# Patient Record
Sex: FEMALE | Race: WHITE | NOT HISPANIC OR LATINO | Employment: OTHER | URBAN - NONMETROPOLITAN AREA
[De-identification: names, ages, dates, MRNs, and addresses within clinical notes are randomized per-mention and may not be internally consistent; named-entity substitution may affect disease eponyms.]

---

## 2018-07-03 ENCOUNTER — TRANSCRIBE ORDERS (OUTPATIENT)
Dept: ADMINISTRATIVE | Facility: HOSPITAL | Age: 53
End: 2018-07-03

## 2018-07-03 DIAGNOSIS — Z12.39 SCREENING BREAST EXAMINATION: Primary | ICD-10-CM

## 2019-10-11 ENCOUNTER — TRANSCRIBE ORDERS (OUTPATIENT)
Dept: GENERAL RADIOLOGY | Facility: HOSPITAL | Age: 54
End: 2019-10-11

## 2019-10-11 ENCOUNTER — HOSPITAL ENCOUNTER (OUTPATIENT)
Dept: GENERAL RADIOLOGY | Facility: HOSPITAL | Age: 54
Discharge: HOME | End: 2019-10-11
Admitting: NURSE PRACTITIONER

## 2019-10-11 DIAGNOSIS — R07.1 PAINFUL RESPIRATION: Primary | ICD-10-CM

## 2019-10-11 DIAGNOSIS — R07.1 PAINFUL RESPIRATION: ICD-10-CM

## 2019-10-11 PROCEDURE — 71046 X-RAY EXAM CHEST 2 VIEWS: CPT

## 2020-03-13 ENCOUNTER — TRANSCRIBE ORDERS (OUTPATIENT)
Dept: ADMINISTRATIVE | Facility: HOSPITAL | Age: 55
End: 2020-03-13

## 2020-03-13 DIAGNOSIS — M54.6 PAIN IN THORACIC SPINE: Primary | ICD-10-CM

## 2020-03-13 DIAGNOSIS — M54.42 ACUTE BACK PAIN WITH SCIATICA, LEFT: ICD-10-CM

## 2020-03-24 ENCOUNTER — APPOINTMENT (OUTPATIENT)
Dept: MRI IMAGING | Facility: HOSPITAL | Age: 55
End: 2020-03-24

## 2020-03-24 ENCOUNTER — HOSPITAL ENCOUNTER (OUTPATIENT)
Dept: MRI IMAGING | Facility: HOSPITAL | Age: 55
End: 2020-03-24

## 2020-04-03 ENCOUNTER — APPOINTMENT (OUTPATIENT)
Dept: MRI IMAGING | Facility: HOSPITAL | Age: 55
End: 2020-04-03

## 2020-04-03 ENCOUNTER — HOSPITAL ENCOUNTER (OUTPATIENT)
Dept: MRI IMAGING | Facility: HOSPITAL | Age: 55
End: 2020-04-03

## 2020-04-28 ENCOUNTER — APPOINTMENT (OUTPATIENT)
Dept: MRI IMAGING | Facility: HOSPITAL | Age: 55
End: 2020-04-28

## 2020-04-28 ENCOUNTER — HOSPITAL ENCOUNTER (OUTPATIENT)
Dept: MRI IMAGING | Facility: HOSPITAL | Age: 55
End: 2020-04-28

## 2020-05-12 ENCOUNTER — TRANSCRIBE ORDERS (OUTPATIENT)
Dept: PHYSICAL THERAPY | Facility: CLINIC | Age: 55
End: 2020-05-12

## 2020-05-12 DIAGNOSIS — M25.552 LEFT HIP PAIN: ICD-10-CM

## 2020-05-12 DIAGNOSIS — M54.50 LOW BACK PAIN, UNSPECIFIED BACK PAIN LATERALITY, UNSPECIFIED CHRONICITY, UNSPECIFIED WHETHER SCIATICA PRESENT: Primary | ICD-10-CM

## 2020-05-18 ENCOUNTER — TREATMENT (OUTPATIENT)
Dept: PHYSICAL THERAPY | Facility: CLINIC | Age: 55
End: 2020-05-18

## 2020-05-18 DIAGNOSIS — M54.42 ACUTE LEFT-SIDED LOW BACK PAIN WITH LEFT-SIDED SCIATICA: Primary | ICD-10-CM

## 2020-05-18 PROCEDURE — 97110 THERAPEUTIC EXERCISES: CPT | Performed by: PHYSICAL THERAPIST

## 2020-05-18 PROCEDURE — 97161 PT EVAL LOW COMPLEX 20 MIN: CPT | Performed by: PHYSICAL THERAPIST

## 2020-05-18 NOTE — PROGRESS NOTES
Physical Therapy Initial Evaluation and Plan of Care      Patient: Brad Snowden   : 1965  Diagnosis/ICD-10 Code:  Acute left-sided low back pain with left-sided sciatica [M54.42]  Referring practitioner: Chantal Espinoza PA-C    Subjective Evaluation    History of Present Illness  Mechanism of injury: Fall at work on 3/6/20.  She was at work and fell over some bun trays.  She landed on the L side and hip.      Pt went to the MD on 3/10/20 at the ED.  She went to regular MD after that.            Patient Occupation: works at [employer].    She is currently off work since 3/14/20.    Pain  Current pain ratin  Location: Mid part of the lumbar spine and the L L/S.  no leg pain at this time.  Shooting pain occasionally in the L LE.  Quality: pressure  Relieving factors: support and rest (sitting on the R side)  Aggravating factors: movement, lifting, ambulation and repetitive movement  Progression: no change             Objective          Postural Observations    Additional Postural Observation Details  B Paraspinal hypertonic and guarded.      Active Range of Motion     Lumbar   Flexion: Active lumbar flexion: Finger tips to knee cap B LE.  with pain  Extension: Active lumbar extension: 20% with pain    Additional Active Range of Motion Details  Pseudorotation same amount of pain as Rotation.          Passive Range of Motion     Additional Passive Range of Motion Details  Lumbar LTR mobility L 15 degrees,  R 20 degrees.     PROM of the spine is guarded and painful.  End feel is normal except for the guarding.      Strength/Myotome Testing     Lumbar   Left   Heel walk: normal  Toe walk: normal    Right   Heel walk: normal  Toe walk: normal    Tests     Lumbar     Left   Negative crossed SLR and passive SLR.     Right   Negative crossed SLR and passive SLR.     Additional Tests Details  Supine hook lying 4/10 pain initially.      Ambulation     Observational Gait   Gait: antalgic   Decreased walking speed  and stride length.   Base of support: increased    Quality of Movement During Gait   Trunk  Forward lean.           Assessment & Plan     Assessment  Impairments: abnormal muscle tone, abnormal or restricted ROM, activity intolerance, lacks appropriate home exercise program and pain with function  Assessment details: Patient is a 54 year old female who comes to physical therapy with injury from work.  Signs and symptoms are consistent with lumbar injury.  She has no radicular sxs today.  All of her sxs are central in the lumbar spine.  The patient currently has pain, decreased ROM, decreased strength, and inability to perform all essential functional activities. Pt will benefit from skilled PT services to address the above issues.     Prognosis: good  Functional Limitations: carrying objects, lifting, pulling, pushing, uncomfortable because of pain, sitting, standing, stooping and unable to perform repetitive tasks  Goals  Plan Goals: SHORT TERM GOALS:     2 weeks  1. Pt independent with HEP  2. Pt to demonstrate trunk AROM 50-75% of expected norms to allow for improved ability to perform ADL's  3. Pt to demonstrate bilateral hip strength 4/5 in all planes to improved stability of the core/trunk     LONG TERM GOALS:   6 weeks  1. Pt to demonstrate trunk AROM % of expected norms to allow for improved ability to perform functional activities  2. Pt to demonstrate ability to perform full functional squat with good form and without increased pain in the low back   3. Pt to report being able to work full shift or work in the home without increase in pain in the back  4. Pt to report cessation of pain/numbness/tingling into the left leg to show decreased nerve compression      Plan  Therapy options: will be seen for skilled physical therapy services  Planned modality interventions: cryotherapy, thermotherapy (hydrocollator packs) and electrical stimulation/Russian stimulation  Planned therapy interventions:  abdominal trunk stabilization, manual therapy, spinal/joint mobilization, soft tissue mobilization, strengthening, stretching, therapeutic activities, functional ROM exercises, flexibility, body mechanics training, neuromuscular re-education and postural training  Treatment plan discussed with: patient  Plan details: Pt will be seen 2-3 x / week.  Assess Pt response to PREP, posture and body mechanics.         Manual Therapy:         mins  29025;  Therapeutic Exercise:    24     mins  54150;     Neuromuscular Nuha:        mins  36793;    Therapeutic Activity:          mins  60007;     Gait Training:           mins  30839;     Ultrasound:          mins  91231;    Electrical Stimulation:         mins  65752 ( );  Dry Needling          mins self-pay    Timed Treatment:   24   mins   Total Treatment:     49   mins    PT SIGNATURE: Coleman Liang, PT   DATE TREATMENT INITIATED: 5/18/2020    Initial Certification  Certification Period: 8/16/2020  I certify that the therapy services are furnished while this patient is under my care.  The services outlined above are required by this patient, and will be reviewed every 90 days.     PHYSICIAN: Chantal Espinoza PA-C      DATE:     Please sign and return via fax to  .. Thank you, [facility].

## 2020-05-20 ENCOUNTER — TREATMENT (OUTPATIENT)
Dept: PHYSICAL THERAPY | Facility: CLINIC | Age: 55
End: 2020-05-20

## 2020-05-20 DIAGNOSIS — M54.42 ACUTE LEFT-SIDED LOW BACK PAIN WITH LEFT-SIDED SCIATICA: Primary | ICD-10-CM

## 2020-05-20 PROCEDURE — 97110 THERAPEUTIC EXERCISES: CPT | Performed by: PHYSICAL THERAPIST

## 2020-05-20 PROCEDURE — 97140 MANUAL THERAPY 1/> REGIONS: CPT | Performed by: PHYSICAL THERAPIST

## 2020-05-20 NOTE — PROGRESS NOTES
Physical Therapy Daily Progress Note    Patient Information  Brad Snowden  1965      Visit # : 2    Brad Snowden reports 4-5/10 pain today at rest.  Pt reports pain is in the Back in the same area and same size.  Pt reports last night she had some shooting pain into the L LE in different spots.  It goes half way down the shin.      Pt reports 2 times in 2 days.      Objective Pt presents to PT today with minimal+  distress noted with activity.     Pt with exercises performed with minimal + feedback.  No significant changes in the sxs with activity.     Palpation:  Minimal elevated hypertonicity noted in the L lumbar spine.  She is reporting pain diffusely throughout the lumbar and hip pelvic area B L greater than R.     Lumbar distraction / myofascial unloaded did not reduce pain.      Palpation exam and gentle testing seemed to elevate the pain.      MARQUISE stretch elevated pain to 6/10 in the lumbar spine.     See Exercise, Manual, and Modality Logs for complete treatment.     Assessment/Plan  Pt with pain about the same.  She is needed cues for HEP performance.        Progress per Plan of Care and Progress strengthening /stabilization /functional activity      Visit Diagnoses:    ICD-10-CM ICD-9-CM   1. Acute left-sided low back pain with left-sided sciatica M54.42 724.2     724.3            Manual Therapy:    9     mins  08817;  Therapeutic Exercise:    43     mins  63541;     Neuromuscular Nuha:        mins  14695;    Therapeutic Activity:          mins  25533;     Gait Training:        ___  mins  78817;     Ultrasound:          mins  34110;    Electrical Stimulation:         mins  52901 ( );  Dry Needling          mins self-pay    Timed Treatment:   52   mins   Total Treatment:     52   mins    Coleman Liang, PT  Physical Therapist

## 2020-05-27 ENCOUNTER — TREATMENT (OUTPATIENT)
Dept: PHYSICAL THERAPY | Facility: CLINIC | Age: 55
End: 2020-05-27

## 2020-05-27 DIAGNOSIS — M54.42 ACUTE LEFT-SIDED LOW BACK PAIN WITH LEFT-SIDED SCIATICA: Primary | ICD-10-CM

## 2020-05-27 PROCEDURE — 97140 MANUAL THERAPY 1/> REGIONS: CPT | Performed by: PHYSICAL THERAPIST

## 2020-05-27 PROCEDURE — 97110 THERAPEUTIC EXERCISES: CPT | Performed by: PHYSICAL THERAPIST

## 2020-05-27 PROCEDURE — 97530 THERAPEUTIC ACTIVITIES: CPT | Performed by: PHYSICAL THERAPIST

## 2020-05-27 NOTE — PROGRESS NOTES
Physical Therapy Daily Progress Note    Patient Information  Brad Snowden  1965      Visit # : 3    Brad Snowden reports 8/10 pain today at rest.  Pt reports that she has been really bad for the past 3 days for no known reason.  Her L hip popped and it had a lot of pain associated with it.    Pain is primarily in the sacrum and the lumbar spine secondarily.  Standing up tall creates pain in the back and down the inside of the legs in the groin.      Pt reports the exercises seem to be making her sxs worse.   Pt is returning back to the MD tomorrow.      Objective Pt presents to PT today with forward flexed trunk and moderate distress noted with resting position and moderate to severe distress with ambulation.     AROM:  Trunk flexion Finger tips to top of the knee.  Trunk ext to neutral only with pain.   Trunk extension is more painful today.     Left hip mobility creates more pain than the R side today.     Trial of SI belt today seemed to not have a significant improvement in sxs.  Pt was very guarded with transfers and PROM activity.     SLR is (-) B LE    Upper Lumbar Neural tension test (+) for pain and guarding in B LE's with slight reproduction of sxs in to the thigh. L greater than the R LE.     See Exercise, Manual, and Modality Logs for complete treatment.     Assessment/Plan  Pt with elevated sxs today and over the past 3 days.  Pt with no improvement in the 3 visits of PT and her sxs today are worse.  She did seem to have some pain with Upper lumbar neural tension L greater than the R LE.     Positional testing did not reduce or elevate any symptoms today.         Awaiting MD orders  We will hold until further MD follow up and instruction.                  Visit Diagnoses:    ICD-10-CM ICD-9-CM   1. Acute left-sided low back pain with left-sided sciatica M54.42 724.2     724.3            Manual Therapy:    13     mins  68406;  Therapeutic Exercise:    28     mins  81943;     Neuromuscular Nuha:         mins  87975;    Therapeutic Activity:     11     mins  13874;     Gait Training:        ___  mins  94252;     Ultrasound:          mins  96862;    Electrical Stimulation:         mins  89042 ( );  Dry Needling          mins self-pay    Timed Treatment:   52   mins   Total Treatment:     52   mins    Coleman Liang, PT  Physical Therapist

## 2020-06-25 ENCOUNTER — TRANSCRIBE ORDERS (OUTPATIENT)
Dept: MRI IMAGING | Facility: HOSPITAL | Age: 55
End: 2020-06-25

## 2020-06-25 DIAGNOSIS — S13.4XXA WHIPLASH INJURY TO NECK, INITIAL ENCOUNTER: Primary | ICD-10-CM

## 2020-07-10 ENCOUNTER — HOSPITAL ENCOUNTER (OUTPATIENT)
Dept: MRI IMAGING | Facility: HOSPITAL | Age: 55
Discharge: HOME | End: 2020-07-10
Admitting: ORTHOPAEDIC SURGERY

## 2020-07-10 DIAGNOSIS — S13.4XXA WHIPLASH INJURY TO NECK, INITIAL ENCOUNTER: ICD-10-CM

## 2020-07-10 PROCEDURE — 72141 MRI NECK SPINE W/O DYE: CPT

## 2020-08-27 ENCOUNTER — TRANSCRIBE ORDERS (OUTPATIENT)
Dept: ADMINISTRATIVE | Facility: HOSPITAL | Age: 55
End: 2020-08-27

## 2020-08-27 DIAGNOSIS — R10.30 LOWER ABDOMINAL PAIN: Primary | ICD-10-CM

## 2020-09-18 ENCOUNTER — TRANSCRIBE ORDERS (OUTPATIENT)
Dept: PHYSICAL THERAPY | Facility: CLINIC | Age: 55
End: 2020-09-18

## 2020-09-18 DIAGNOSIS — M54.12 RADICULOPATHY, CERVICAL: Primary | ICD-10-CM

## 2020-09-24 ENCOUNTER — HOSPITAL ENCOUNTER (OUTPATIENT)
Dept: CT IMAGING | Facility: HOSPITAL | Age: 55
Discharge: HOME | End: 2020-09-24
Admitting: NURSE PRACTITIONER

## 2020-09-24 DIAGNOSIS — R10.30 LOWER ABDOMINAL PAIN: ICD-10-CM

## 2020-09-24 PROCEDURE — 74176 CT ABD & PELVIS W/O CONTRAST: CPT

## 2020-10-08 ENCOUNTER — TELEPHONE (OUTPATIENT)
Dept: ORTHOPEDICS | Facility: OTHER | Age: 55
End: 2020-10-08

## 2020-12-16 ENCOUNTER — TREATMENT (OUTPATIENT)
Dept: PHYSICAL THERAPY | Facility: CLINIC | Age: 55
End: 2020-12-16

## 2020-12-16 ENCOUNTER — TRANSCRIBE ORDERS (OUTPATIENT)
Dept: PHYSICAL THERAPY | Facility: CLINIC | Age: 55
End: 2020-12-16

## 2020-12-16 DIAGNOSIS — M54.12 RADICULOPATHY, CERVICAL: Primary | ICD-10-CM

## 2020-12-16 DIAGNOSIS — M54.12 CERVICAL RADICULAR PAIN: Primary | ICD-10-CM

## 2020-12-16 PROCEDURE — 97161 PT EVAL LOW COMPLEX 20 MIN: CPT | Performed by: PHYSICAL THERAPIST

## 2020-12-16 PROCEDURE — 97140 MANUAL THERAPY 1/> REGIONS: CPT | Performed by: PHYSICAL THERAPIST

## 2020-12-16 PROCEDURE — 97110 THERAPEUTIC EXERCISES: CPT | Performed by: PHYSICAL THERAPIST

## 2020-12-16 NOTE — PROGRESS NOTES
Physical Therapy Initial Evaluation and Plan of Care      Patient: Brad Snowden   : 1965  Diagnosis/ICD-10 Code:  Cervical radicular pain [M54.12]  Referring practitioner: Talia Contreras DO    Subjective Evaluation    History of Present Illness  Mechanism of injury: Patient reports she fell on 2020 which resulted in neck, back and shoulder pain. She received treatment for her back pain and she thought her neck pain would resolve on its own. Her neck pain has continued to worsen and has really bothersome since . She reports that she has numbness and pain that goes down into her L UE but this does not happen all the time. She reports that at times her neck will catch with certain movements. She reports that sitting upright without any support is really irritating. She stated that standing on concrete at work really irritates her neck after approx 2 hours.    She also reports that she slept weird two nights ago and her pain has been really irritated since then. Prior to sleeping in a weird position she feels her neck was some better.    She reports that she is currently under restrictions from MD that consist of no repetitive twisting, reaching overhead, or mopping and sweeping, and can not lift >10lbs.    Quality of life: good    Pain  Current pain ratin  At best pain ratin  At worst pain ratin  Quality: dull ache (Dull ache that is always present)  Relieving factors: medications and ice (Patient reports since she has been prescribed a muscle relaxer her pain has improved but she is still in pain )  Aggravating factors: overhead activity, lifting, outstretched reach, repetitive movement, prolonged positioning and standing    Hand dominance: left    Treatments  Previous treatment: medication  Current treatment: medication and physical therapy  Patient Goals  Patient goals for therapy: decreased pain, independence with ADLs/IADLs and return to work             Objective          Postural  Observations  Seated posture: fair  Standing posture: good    Additional Postural Observation Details  Patient has rounded shoulders with a forward head in seated position.    Palpation   Left   Hypertonic in the middle trapezius and upper trapezius.   Tenderness of the middle trapezius and upper trapezius.     Neurological Testing     Sensation   Cervical/Thoracic   Left   Intact: light touch    Right   Intact: light touch    Reflexes   Left   Biceps (C5/C6): normal (2+)  Brachioradialis (C6): normal (2+)  Triceps (C7): trace (1+)    Right   Biceps (C5/C6): normal (2+)  Brachioradialis (C6): normal (2+)  Triceps (C7): normal (2+)    Additional Neurological Details  Sensation intact and equal on B UE.    Active Range of Motion   Cervical/Thoracic Spine   Cervical    Flexion: 22 degrees   Extension: 16 degrees   Left lateral flexion: 19 degrees   Right lateral flexion: 17 (R lateral flexion is more painful than L lateral rotation) degrees   Left rotation: 36 degrees   Right rotation: 38 (R rotation is more painful than L rotation) degrees     Thoracic   Flexion: WFL  Extension: WFL    Strength/Myotome Testing     Left Shoulder     Planes of Motion   Flexion: 4-   Abduction: 4-     Right Shoulder     Planes of Motion   Flexion: 4   Abduction: 4     Left Elbow   Flexion: 4  Extension: 4    Right Elbow   Flexion: 4+  Extension: 4+    Tests   Cervical     Left   Positive cervical distraction and Spurling's sign.     Right   Positive cervical distraction.   Negative Spurling's sign.     Thoracic   Negative slump.     Additional Tests Details  Spurling's was positive on the L with distraction relieving symptoms in L UE. Patient had complaints of muscle pain in L upper trap with testing spurling's on R but no symptoms were reproduced.          Assessment & Plan     Assessment  Impairments: abnormal muscle tone, abnormal or restricted ROM, activity intolerance, impaired physical strength, lacks appropriate home exercise  program and pain with function  Assessment details: Patient is a 55 year old female who comes to physical therapy with complaints of neck pain resulting from a fall at work that occurred earlier this year. Signs and symptoms are consistent with cervical pain with radiculopathy.  The patient currently has pain, decreased ROM, decreased strength, and inability to perform all essential functional activities. Pt will benefit from skilled PT services to address the above issues.     Functional Limitations: carrying objects, lifting, walking, pulling, pushing, uncomfortable because of pain, sitting, standing, reaching overhead and unable to perform repetitive tasks  Goals  Plan Goals: SHORT TERM GOALS:     2 weeks  1. Pt independent with HEP  2. Pt to demonstrate cervical AROM 10-25% of expected norms to allow for improved ability to perform ADL's    LONG TERM GOALS:   6 weeks  1. Pt to demonstrate cervical AROM 25-50% of expected norms to allow for improved safety when driving  2. Pt to demonstrate ability to lift 5# OH with bilateral arm(s) without increase in pain in the neck   3. Pt to report being able to work full shift or work in the home without increase in pain in the neck            Plan  Therapy options: will be seen for skilled physical therapy services  Planned modality interventions: cryotherapy  Planned therapy interventions: functional ROM exercises, home exercise program, joint mobilization, therapeutic activities, stretching, strengthening, spinal/joint mobilization, soft tissue mobilization, neuromuscular re-education and manual therapy  Frequency: 2x week  Duration in weeks: 6        Manual Therapy:    11     mins  93653;  Therapeutic Exercise:    13     mins  95662;     Neuromuscular Nuha:        mins  61744;    Therapeutic Activity:          mins  66658;     Gait Training:           mins  14819;     Ultrasound:          mins  31738;    Electrical Stimulation:         mins  08827 ( );  Dry  Needling          mins self-pay    Timed Treatment:   56   mins   Total Treatment:     67   mins    PT SIGNATURE: Emani Hannah, PT   DATE TREATMENT INITIATED: 12/16/2020    Initial Certification  Certification Period: 3/16/2021  I certify that the therapy services are furnished while this patient is under my care.  The services outlined above are required by this patient, and will be reviewed every 90 days.     PHYSICIAN: Talia Contreras, DO      DATE:     Please sign and return via fax to [phone].. Thank you, [facility].

## 2020-12-21 ENCOUNTER — TREATMENT (OUTPATIENT)
Dept: PHYSICAL THERAPY | Facility: CLINIC | Age: 55
End: 2020-12-21

## 2020-12-21 DIAGNOSIS — M54.42 ACUTE LEFT-SIDED LOW BACK PAIN WITH LEFT-SIDED SCIATICA: ICD-10-CM

## 2020-12-21 DIAGNOSIS — M54.12 CERVICAL RADICULAR PAIN: Primary | ICD-10-CM

## 2020-12-21 PROCEDURE — 97110 THERAPEUTIC EXERCISES: CPT | Performed by: PHYSICAL THERAPIST

## 2020-12-21 PROCEDURE — 97140 MANUAL THERAPY 1/> REGIONS: CPT | Performed by: PHYSICAL THERAPIST

## 2020-12-21 NOTE — PROGRESS NOTES
Physical Therapy Daily Progress Note      Visit #: 2    Brad Snowden reports 2-3/10 pain today at rest.  Pt reports that she has minimal tingling in the L hand. Pt reports that her neck is still very sore and has decreased motion.         Objective Pt present to PT today with no distress at rest.     Pt with decreased rotation, lateral flexion in cervical spine passively and actively.     Pt with no change in hand symptoms reported today.       See Exercise, Manual, and Modality Logs for complete treatment.     Assessment/Plan  Pt continues to have pain and decreased motion in the cervical spine. Pt is very guarded and avoids movement. Pt would benefit from PT to help increase cervical spine mobility and function.       Progress per Plan of Care  Progress as tolerated    Visit Diagnosis:    ICD-10-CM ICD-9-CM   1. Cervical radicular pain  M54.12 723.4   2. Acute left-sided low back pain with left-sided sciatica  M54.42 724.2     724.3            Manual Therapy:    25     mins  18315;  Therapeutic Exercise:    12     mins  38421;     Neuromuscular Nuha:        mins  09544;    Therapeutic Activity:          mins  24780;     Gait Training:           mins  22927;     Ultrasound:          mins  42664;    Electrical Stimulation:         mins  96754 ( );  Dry Needling          mins self-pay  Iontophoresis          mins 50804      Timed Treatment:   37   mins   Total Treatment:     50   mins    Suresh Rausch, PT  Physical Therapist

## 2020-12-23 ENCOUNTER — TREATMENT (OUTPATIENT)
Dept: PHYSICAL THERAPY | Facility: CLINIC | Age: 55
End: 2020-12-23

## 2020-12-23 DIAGNOSIS — M54.42 ACUTE LEFT-SIDED LOW BACK PAIN WITH LEFT-SIDED SCIATICA: Primary | ICD-10-CM

## 2020-12-23 DIAGNOSIS — M54.12 CERVICAL RADICULAR PAIN: ICD-10-CM

## 2020-12-23 PROCEDURE — 97140 MANUAL THERAPY 1/> REGIONS: CPT | Performed by: PHYSICAL THERAPIST

## 2020-12-23 PROCEDURE — 97035 APP MDLTY 1+ULTRASOUND EA 15: CPT | Performed by: PHYSICAL THERAPIST

## 2020-12-23 PROCEDURE — 97110 THERAPEUTIC EXERCISES: CPT | Performed by: PHYSICAL THERAPIST

## 2020-12-23 NOTE — PROGRESS NOTES
Physical Therapy Daily Progress Note      Visit #: 3    Brad Snowden reports 3/10 pain today at rest.  Pt reports that she had an aching pain after last session in her neck although no more pain or tingling in the L UE.         Objective Pt present to PT today with no distress at rest.     Pt with notably improved cervical motion although pt still guarded with manual therapy.     Pt with acute tenderness over C4-6 spinous processes.     Pt with tenderness over L levator scap distal insertion extending superior into the neck.       See Exercise, Manual, and Modality Logs for complete treatment.     Assessment/Plan  Pt responded well to gentle mobilization and US well today. Pt would benefit from PT to help improve UE symptoms and restore cervical function while reducing pain.       Progress per Plan of Care  Progress as tolerated    Visit Diagnosis:    ICD-10-CM ICD-9-CM   1. Acute left-sided low back pain with left-sided sciatica  M54.42 724.2     724.3   2. Cervical radicular pain  M54.12 723.4            Manual Therapy:    25     mins  56719;  Therapeutic Exercise:    10     mins  33490;     Neuromuscular Nuha:        mins  90628;    Therapeutic Activity:          mins  95720;     Gait Training:           mins  24470;     Ultrasound:    10      mins  53541;    Electrical Stimulation:         mins  02800 ( );  Dry Needling          mins self-pay  Iontophoresis          mins 42750      Timed Treatment:   45   mins   Total Treatment:     45   mins    Suresh Rausch, PT  Physical Therapist

## 2021-11-10 ENCOUNTER — TRANSCRIBE ORDERS (OUTPATIENT)
Dept: ADMINISTRATIVE | Facility: HOSPITAL | Age: 56
End: 2021-11-10

## 2021-11-10 DIAGNOSIS — R10.32 ABDOMINAL PAIN, LLQ: Primary | ICD-10-CM

## 2021-11-23 ENCOUNTER — APPOINTMENT (OUTPATIENT)
Dept: CT IMAGING | Facility: HOSPITAL | Age: 56
End: 2021-11-23

## 2022-03-18 RX ORDER — ALBUTEROL SULFATE 90 UG/1
AEROSOL, METERED RESPIRATORY (INHALATION)
COMMUNITY

## 2022-03-18 RX ORDER — AZITHROMYCIN 250 MG/1
TABLET, FILM COATED ORAL
COMMUNITY
End: 2022-10-18

## 2022-03-18 RX ORDER — SPINOSAD 9 MG/ML
SUSPENSION TOPICAL
COMMUNITY
Start: 2021-12-15 | End: 2022-10-18

## 2022-03-18 RX ORDER — TIZANIDINE 4 MG/1
TABLET ORAL
COMMUNITY
End: 2022-10-18 | Stop reason: ALTCHOICE

## 2022-03-18 RX ORDER — CYCLOBENZAPRINE HCL 10 MG
TABLET ORAL
COMMUNITY
Start: 2021-12-20 | End: 2022-12-14 | Stop reason: HOSPADM

## 2022-03-18 RX ORDER — AMMONIUM LACTATE 12 G/100G
LOTION TOPICAL
COMMUNITY
End: 2022-10-18

## 2022-03-18 RX ORDER — BUSPIRONE HYDROCHLORIDE 7.5 MG/1
7.5 TABLET ORAL 2 TIMES DAILY
COMMUNITY
Start: 2022-01-21 | End: 2022-12-14 | Stop reason: HOSPADM

## 2022-03-18 RX ORDER — PREDNISONE 10 MG/1
TABLET ORAL
COMMUNITY
End: 2022-10-18

## 2022-03-18 RX ORDER — IBUPROFEN 800 MG/1
TABLET ORAL
COMMUNITY
End: 2022-12-14 | Stop reason: HOSPADM

## 2022-03-18 RX ORDER — BETAMETHASONE DIPROPIONATE 0.5 MG/G
CREAM TOPICAL
COMMUNITY
Start: 2022-01-21

## 2022-03-18 RX ORDER — GABAPENTIN 300 MG/1
300 CAPSULE ORAL 2 TIMES DAILY
COMMUNITY
Start: 2022-01-21 | End: 2022-10-18

## 2022-03-18 RX ORDER — NICOTINE 21 MG/24HR
PATCH, TRANSDERMAL 24 HOURS TRANSDERMAL
COMMUNITY

## 2022-03-18 RX ORDER — NAPROXEN 500 MG/1
TABLET ORAL
COMMUNITY
End: 2022-10-18

## 2022-03-18 RX ORDER — BUPRENORPHINE HYDROCHLORIDE AND NALOXONE HYDROCHLORIDE DIHYDRATE 8; 2 MG/1; MG/1
TABLET SUBLINGUAL
COMMUNITY

## 2022-03-18 RX ORDER — DICLOFENAC SODIUM 75 MG/1
TABLET, DELAYED RELEASE ORAL
COMMUNITY

## 2022-03-21 RX ORDER — DOCUSATE SODIUM 100 MG/1
100 CAPSULE, LIQUID FILLED ORAL DAILY PRN
COMMUNITY
Start: 2022-03-01 | End: 2022-10-18 | Stop reason: SDUPTHER

## 2022-03-21 RX ORDER — LEVOTHYROXINE SODIUM 0.03 MG/1
TABLET ORAL
COMMUNITY
Start: 2022-03-16 | End: 2022-10-18

## 2022-03-21 RX ORDER — BUDESONIDE AND FORMOTEROL FUMARATE DIHYDRATE 160; 4.5 UG/1; UG/1
AEROSOL RESPIRATORY (INHALATION) DAILY PRN
COMMUNITY
Start: 2022-03-17

## 2022-03-21 RX ORDER — ATORVASTATIN CALCIUM 10 MG/1
10 TABLET, FILM COATED ORAL DAILY
COMMUNITY
Start: 2022-03-01 | End: 2022-10-18

## 2022-03-21 RX ORDER — MONTELUKAST SODIUM 10 MG/1
10 TABLET ORAL DAILY
COMMUNITY
Start: 2022-03-01

## 2022-03-21 RX ORDER — NICOTINE 21 MG/24HR
PATCH, TRANSDERMAL 24 HOURS TRANSDERMAL
COMMUNITY
Start: 2022-01-28 | End: 2022-03-21 | Stop reason: SDUPTHER

## 2022-03-28 ENCOUNTER — TRANSCRIBE ORDERS (OUTPATIENT)
Dept: ADMINISTRATIVE | Facility: HOSPITAL | Age: 57
End: 2022-03-28

## 2022-03-28 DIAGNOSIS — R59.0 AXILLARY LYMPHADENOPATHY: ICD-10-CM

## 2022-03-28 DIAGNOSIS — N63.20 LEFT BREAST LUMP: Primary | ICD-10-CM

## 2022-04-12 ENCOUNTER — HOSPITAL ENCOUNTER (OUTPATIENT)
Dept: ULTRASOUND IMAGING | Facility: HOSPITAL | Age: 57
End: 2022-04-12

## 2022-10-18 ENCOUNTER — OFFICE VISIT (OUTPATIENT)
Dept: GASTROENTEROLOGY | Facility: CLINIC | Age: 57
End: 2022-10-18

## 2022-10-18 ENCOUNTER — LAB (OUTPATIENT)
Dept: LAB | Facility: HOSPITAL | Age: 57
End: 2022-10-18

## 2022-10-18 VITALS
DIASTOLIC BLOOD PRESSURE: 78 MMHG | SYSTOLIC BLOOD PRESSURE: 120 MMHG | BODY MASS INDEX: 22.02 KG/M2 | HEIGHT: 66 IN | WEIGHT: 137 LBS

## 2022-10-18 DIAGNOSIS — F10.10 ALCOHOL ABUSE: Chronic | ICD-10-CM

## 2022-10-18 DIAGNOSIS — R11.0 NAUSEA: Chronic | ICD-10-CM

## 2022-10-18 DIAGNOSIS — R79.89 ELEVATED LIVER FUNCTION TESTS: Chronic | ICD-10-CM

## 2022-10-18 DIAGNOSIS — Z86.19 HISTORY OF HEPATITIS C: Chronic | ICD-10-CM

## 2022-10-18 DIAGNOSIS — K59.00 CONSTIPATION, UNSPECIFIED CONSTIPATION TYPE: Chronic | ICD-10-CM

## 2022-10-18 DIAGNOSIS — K62.5 RECTAL BLEEDING: Chronic | ICD-10-CM

## 2022-10-18 DIAGNOSIS — R10.33 PERIUMBILICAL ABDOMINAL PAIN: Primary | Chronic | ICD-10-CM

## 2022-10-18 DIAGNOSIS — K21.9 GASTROESOPHAGEAL REFLUX DISEASE, UNSPECIFIED WHETHER ESOPHAGITIS PRESENT: Chronic | ICD-10-CM

## 2022-10-18 DIAGNOSIS — Z12.11 ENCOUNTER FOR SCREENING FOR MALIGNANT NEOPLASM OF COLON: ICD-10-CM

## 2022-10-18 DIAGNOSIS — R10.33 PERIUMBILICAL ABDOMINAL PAIN: Chronic | ICD-10-CM

## 2022-10-18 PROBLEM — Z87.898 H/O INTRAVENOUS DRUG USE IN REMISSION: Status: ACTIVE | Noted: 2022-10-18

## 2022-10-18 PROBLEM — F17.200 TOBACCO USE DISORDER: Status: ACTIVE | Noted: 2022-10-18

## 2022-10-18 PROBLEM — F19.91 H/O INTRAVENOUS DRUG USE IN REMISSION: Status: ACTIVE | Noted: 2022-10-18

## 2022-10-18 PROBLEM — M54.2 CERVICAL SPINE PAIN: Status: ACTIVE | Noted: 2022-10-18

## 2022-10-18 PROBLEM — F41.9 ANXIETY: Status: ACTIVE | Noted: 2022-10-18

## 2022-10-18 PROBLEM — J44.1 COPD EXACERBATION (HCC): Status: ACTIVE | Noted: 2022-10-18

## 2022-10-18 LAB
ALBUMIN SERPL-MCNC: 4.5 G/DL (ref 3.5–5.2)
ALBUMIN/GLOB SERPL: 1.8 G/DL
ALP SERPL-CCNC: 58 U/L (ref 39–117)
ALT SERPL W P-5'-P-CCNC: 39 U/L (ref 1–33)
ANION GAP SERPL CALCULATED.3IONS-SCNC: 9.7 MMOL/L (ref 5–15)
AST SERPL-CCNC: 45 U/L (ref 1–32)
BILIRUB SERPL-MCNC: 0.3 MG/DL (ref 0–1.2)
BUN SERPL-MCNC: 4 MG/DL (ref 6–20)
BUN/CREAT SERPL: 5.9 (ref 7–25)
CALCIUM SPEC-SCNC: 9.6 MG/DL (ref 8.6–10.5)
CHLORIDE SERPL-SCNC: 104 MMOL/L (ref 98–107)
CO2 SERPL-SCNC: 25.3 MMOL/L (ref 22–29)
CREAT SERPL-MCNC: 0.68 MG/DL (ref 0.57–1)
DEPRECATED RDW RBC AUTO: 42.5 FL (ref 37–54)
EGFRCR SERPLBLD CKD-EPI 2021: 103 ML/MIN/1.73
ERYTHROCYTE [DISTWIDTH] IN BLOOD BY AUTOMATED COUNT: 12.2 % (ref 12.3–15.4)
GLOBULIN UR ELPH-MCNC: 2.5 GM/DL
GLUCOSE SERPL-MCNC: 75 MG/DL (ref 65–99)
HBV SURFACE AG SERPL QL IA: NORMAL
HCT VFR BLD AUTO: 43.4 % (ref 34–46.6)
HCV AB SER DONR QL: REACTIVE
HGB BLD-MCNC: 15 G/DL (ref 12–15.9)
HIV1 P24 AG SER QL: NORMAL
HIV1+2 AB SER QL: NORMAL
IGA1 MFR SER: 199 MG/DL (ref 70–400)
INR PPP: 0.92 (ref 0.9–1.1)
LIPASE SERPL-CCNC: 33 U/L (ref 13–60)
MCH RBC QN AUTO: 33.1 PG (ref 26.6–33)
MCHC RBC AUTO-ENTMCNC: 34.6 G/DL (ref 31.5–35.7)
MCV RBC AUTO: 95.8 FL (ref 79–97)
PLATELET # BLD AUTO: 160 10*3/MM3 (ref 140–450)
PMV BLD AUTO: 11.5 FL (ref 6–12)
POTASSIUM SERPL-SCNC: 4.8 MMOL/L (ref 3.5–5.2)
PROT SERPL-MCNC: 7 G/DL (ref 6–8.5)
PROTHROMBIN TIME: 12.7 SECONDS (ref 12.5–14.5)
RBC # BLD AUTO: 4.53 10*6/MM3 (ref 3.77–5.28)
SODIUM SERPL-SCNC: 139 MMOL/L (ref 136–145)
TSH SERPL DL<=0.05 MIU/L-ACNC: 2.66 UIU/ML (ref 0.27–4.2)
WBC NRBC COR # BLD: 7.01 10*3/MM3 (ref 3.4–10.8)

## 2022-10-18 PROCEDURE — 87340 HEPATITIS B SURFACE AG IA: CPT

## 2022-10-18 PROCEDURE — 85610 PROTHROMBIN TIME: CPT

## 2022-10-18 PROCEDURE — 36415 COLL VENOUS BLD VENIPUNCTURE: CPT

## 2022-10-18 PROCEDURE — 84443 ASSAY THYROID STIM HORMONE: CPT

## 2022-10-18 PROCEDURE — 86708 HEPATITIS A ANTIBODY: CPT

## 2022-10-18 PROCEDURE — 83690 ASSAY OF LIPASE: CPT

## 2022-10-18 PROCEDURE — 85027 COMPLETE CBC AUTOMATED: CPT

## 2022-10-18 PROCEDURE — G0475 HIV COMBINATION ASSAY: HCPCS

## 2022-10-18 PROCEDURE — 87522 HEPATITIS C REVRS TRNSCRPJ: CPT

## 2022-10-18 PROCEDURE — 80053 COMPREHEN METABOLIC PANEL: CPT

## 2022-10-18 PROCEDURE — 86704 HEP B CORE ANTIBODY TOTAL: CPT

## 2022-10-18 PROCEDURE — 86364 TISS TRNSGLTMNASE EA IG CLAS: CPT

## 2022-10-18 PROCEDURE — 86317 IMMUNOASSAY INFECTIOUS AGENT: CPT

## 2022-10-18 PROCEDURE — 86803 HEPATITIS C AB TEST: CPT

## 2022-10-18 PROCEDURE — 99204 OFFICE O/P NEW MOD 45 MIN: CPT | Performed by: NURSE PRACTITIONER

## 2022-10-18 PROCEDURE — 83013 H PYLORI (C-13) BREATH: CPT

## 2022-10-18 PROCEDURE — 82784 ASSAY IGA/IGD/IGG/IGM EACH: CPT

## 2022-10-18 RX ORDER — PANTOPRAZOLE SODIUM 40 MG/1
TABLET, DELAYED RELEASE ORAL
Qty: 30 TABLET | Refills: 3 | Status: SHIPPED | OUTPATIENT
Start: 2022-10-18

## 2022-10-18 RX ORDER — BISACODYL 5 MG/1
TABLET, DELAYED RELEASE ORAL
Qty: 4 TABLET | Refills: 0 | Status: SHIPPED | OUTPATIENT
Start: 2022-10-18 | End: 2022-12-14 | Stop reason: HOSPADM

## 2022-10-18 RX ORDER — POLYETHYLENE GLYCOL 1450
POWDER (GRAM) MISCELLANEOUS
Qty: 500 G | Refills: 3 | Status: SHIPPED | OUTPATIENT
Start: 2022-10-18

## 2022-10-18 RX ORDER — SODIUM CHLORIDE 9 MG/ML
70 INJECTION, SOLUTION INTRAVENOUS CONTINUOUS PRN
Status: CANCELLED | OUTPATIENT
Start: 2022-10-18

## 2022-10-18 RX ORDER — ASPIRIN 81 MG
TABLET, DELAYED RELEASE (ENTERIC COATED) ORAL
Qty: 60 TABLET | Refills: 5 | Status: SHIPPED | OUTPATIENT
Start: 2022-10-18

## 2022-10-18 RX ORDER — ONDANSETRON 4 MG/1
4 TABLET, FILM COATED ORAL EVERY 8 HOURS PRN
Qty: 30 TABLET | Refills: 1 | Status: SHIPPED | OUTPATIENT
Start: 2022-10-18

## 2022-10-18 NOTE — PROGRESS NOTES
New Patient Consult      Date: 10/18/2022   Patient Name: Brad Snowden  MRN: [id number]  : 1965     Primary Care Provider: Odalys Maloney, NP-C    Chief Complaint   Patient presents with   • Abdominal Pain     History of Present Illness: Brad Snowden is a 56 y.o. female who is here today to establish care with gastroenterology for abdominal pain.     The patient has a history of periumbilical abdominal pain that started about 5-6 months ago. It worsened yesterday and went to the ER in [place] and had labs and CT scan and was told to have constipation and was given Golytely and Metamucil. She went home and drank half of the Golytely and had 5 bowel movements and the pain is better but not gone. She is on Suboxone and has been taking stool softeners every other day and usually has a bowel movement daily, but at times it is a small amount. She occasionally has a small amount bright red blood in the stool.     She has a history of nausea for many months that is constant. Eating makes the nausea worse. She has vomiting occasionally in the mornings. No history of hematemesis or melena. She occasionally has reflux if she has been drinking alcohol and bends over. She takes TUMs as needed and it can help. No difficulty swallowing.     She had elevated liver enzymes in  per records. She has a history of alcohol use most of her life. She has cut back. She currently has been drinking 6 beers per day for the past 3 years or so. She has a history of IVDA for 15 years, she has been clean for 7 years. She had HCV not detected in . No recent labs or imaging available. She has one tattoo. No history of blood transfusions.     She has not had colonoscopy or EGD in the past. No family history of GI malignancy.    Subjective      Past Medical History:   Diagnosis Date   • Alcohol abuse    • Anxiety    • Cervical spondylosis with radiculopathy    • Depression    • CARINE (generalized anxiety disorder)    • History of  heart attack    • History of hepatitis C    • Hyperlipidemia    • Injury of back    • Injury of neck    • Left lower quadrant pain    • Liver disease    • Shortness of breath      Past Surgical History:   Procedure Laterality Date   •  SECTION     • OVARY SURGERY      right     Family History   Problem Relation Age of Onset   • Cirrhosis Mother    • Liver disease Mother    • Diabetes Mother    • Cervical cancer Mother    • Lung cancer Mother    • Hodgkin's lymphoma Mother    • Stroke Mother    • Alcohol abuse Mother    • Liver disease Father    • Alcohol abuse Father    • Hypertension Sister    • Kidney cancer Sister    • Prader-Willi syndrome Sister    • Cancer Sister         Bladder, possibly kidney as well   • Hypertension Brother    • Alcohol abuse Brother    • Colon cancer Neg Hx      Social History     Socioeconomic History   • Marital status:    Tobacco Use   • Smoking status: Every Day     Packs/day: 1.00     Years: 20.00     Pack years: 20.00     Types: Cigarettes     Start date: 1990   Vaping Use   • Vaping Use: Never used   Substance and Sexual Activity   • Alcohol use: Yes     Alcohol/week: 4.0 standard drinks     Types: 4 Cans of beer per week     Comment: Hx abuse   • Drug use: Not Currently   • Sexual activity: Defer       Current Outpatient Medications:   •  albuterol sulfate  (90 Base) MCG/ACT inhaler, albuterol sulfate HFA 90 mcg/actuation aerosol inhaler  INHALE 2 PUFFS EVERY 6 HOURS, Disp: , Rfl:   •  betamethasone dipropionate 0.05 % cream, APPLY TOPICALLY TO THE AFFECTED AREA TWICE DAILY FOR 10 DAYS, Disp: , Rfl:   •  buprenorphine-naloxone (SUBOXONE) 8-2 MG per SL tablet, buprenorphine 8 mg-naloxone 2 mg sublingual tablet  DISSOLVE 3/4 TABLET UNDER THE TONGUE EVERY DAY, Disp: , Rfl:   •  busPIRone (BUSPAR) 7.5 MG tablet, Take 7.5 mg by mouth 2 (Two) Times a Day., Disp: , Rfl:   •  cyclobenzaprine (FLEXERIL) 10 MG tablet, TAKE 1 TABLET BY MOUTH THREE TIMES DAILY AS  NEEDED FOR SPASM, Disp: , Rfl:   •  diclofenac (VOLTAREN) 75 MG EC tablet, diclofenac sodium 75 mg tablet,delayed release  TK 1 T PO BID, Disp: , Rfl:   •  Diclofenac Sodium (VOLTAREN) 1 % gel gel, APPLY 1 GRAM TOPICALLY TO THE AFFECTED AREA TWICE DAILY, Disp: , Rfl:   •  ibuprofen (ADVIL,MOTRIN) 800 MG tablet, ibuprofen 800 mg tablet  TAKE 1 TABLET BY MOUTH THREE TIMES DAILY AS NEEDED, Disp: , Rfl:   •  montelukast (SINGULAIR) 10 MG tablet, Take 10 mg by mouth Daily., Disp: , Rfl:   •  nicotine (NICODERM CQ) 14 MG/24HR patch, nicotine 14 mg/24 hr daily transdermal patch  APPLY 1 PATCH TOPICALLY TO THE SKIN EVERY DAY, Disp: , Rfl:   •  psyllium (METAMUCIL) 58.6 % packet, Take 1 packet by mouth Daily., Disp: , Rfl:   •  Symbicort 160-4.5 MCG/ACT inhaler, , Disp: , Rfl:   •  bisacodyl (DULCOLAX) 5 MG EC tablet, Take as directed for colon prep, Disp: 4 tablet, Rfl: 0  •  Docusate Sodium 100 MG capsule, Take 1 - 2 tablets daily. Adjust to have 1-2 soft stools daily., Disp: 60 tablet, Rfl: 5  •  ondansetron (ZOFRAN) 4 MG tablet, Take 1 tablet by mouth Every 8 (Eight) Hours As Needed for Nausea or Vomiting., Disp: 30 tablet, Rfl: 1  •  pantoprazole (PROTONIX) 40 MG EC tablet, 1 po daily in the am 30 minutes before breakfast, Disp: 30 tablet, Rfl: 3  •  polyethylene glycol (GoLYTELY) 236 g solution, Take 4,000 mL by mouth 1 (One) Time for 1 dose. Use as directed for colonoscopy prep., Disp: 4000 mL, Rfl: 0  •  Polyethylene Glycol powder, Take 1 cap full (17 grams) in 8 oz of noncarbonated beverage and drink once daily, Disp: 500 g, Rfl: 3     No Known Allergies     The following portions of the patient's history were reviewed and updated as appropriate: allergies, current medications, past family history, past medical history, past social history, past surgical history and problem list.    Objective     Physical Exam  Vitals and nursing note reviewed.   Constitutional:       General: She is not in acute distress.      "Appearance: Normal appearance. She is well-developed.   HENT:      Head: Normocephalic and atraumatic.      Mouth/Throat:      Mouth: Mucous membranes are not pale, not dry and not cyanotic.   Eyes:      General: Lids are normal.   Neck:      Trachea: Trachea normal.   Cardiovascular:      Rate and Rhythm: Normal rate.   Pulmonary:      Effort: Pulmonary effort is normal. No respiratory distress.      Breath sounds: Normal breath sounds.   Abdominal:      General: Bowel sounds are normal.      Palpations: Abdomen is soft. There is no mass.      Tenderness: There is abdominal tenderness in the periumbilical area.      Hernia: No hernia is present.   Skin:     General: Skin is warm and dry.   Neurological:      Mental Status: She is alert and oriented to person, place, and time.   Psychiatric:         Mood and Affect: Mood normal.         Speech: Speech normal.         Behavior: Behavior normal. Behavior is cooperative.       Vitals:    10/18/22 0911   BP: 120/78   Weight: 62.1 kg (137 lb)   Height: 167.6 cm (66\")     Body mass index is 22.11 kg/m².     Results Review:   I have reviewed the patient's new clinical and imaging results.    No visits with results within 90 Day(s) from this visit.   Latest known visit with results is:   No results found for any previous visit.      Dated 6/28/2021 total bilirubin 0.4 alkaline phosphatase 71 AST 54 ALT 45 hemoglobin 14.3 hematocrit 41.7 platelet count 219, TSH 1.580, HCVRNA quantitative: HCV not detected    No radiology results for the last 90 days.     Assessment / Plan      1. Periumbilical abdominal pain  2. Constipation, unspecified constipation type  3. Rectal bleeding  4. Nausea  She has had periumbilical abdominal pain for the past 5 to 6 months that is unchanged.  She was seen in the ER in [place] yesterday and was told to have constipation and given one dose of GoLytely and Metamucil.  She has had 5 bowel movements and the pain is better but not gone.  She " occasionally has a small amount of bright red blood in the stool.  She has nausea for many months that is constant and is worsened by eating.  No history of hematemesis or melena.  The patient had labs and CT scan yesterday at the ER, unfortunately we do not have these results. Suspect symptoms multifactorial including IBS-C, opioids and flexeril.  Will call to obtain CT from [facility].  Advised to eat a softer diet 4-5 very small meals throughout the day.  Pantoprazole 40 mg 1 p.o. daily.  Zofran as needed for nausea.  MiraLAX 17 g daily.  Stool softeners 2/day.  Consider Movantik in the future if no improvement.  Labs  H. pylori breath test  She is due for screening colonoscopy, will schedule.  EGD in the future if no improvement or symptoms worsen.    - CBC (No Diff); Future  - Comprehensive Metabolic Panel; Future  - TSH; Future  - Lipase; Future  - IgA; Future  - Tissue Transglutaminase, IgA; Future  - H. Pylori Breath Test - Breath, Lung; Future  - Polyethylene Glycol powder; Take 1 cap full (17 grams) in 8 oz of noncarbonated beverage and drink once daily  Dispense: 500 g; Refill: 3  - Docusate Sodium 100 MG capsule; Take 1 - 2 tablets daily. Adjust to have 1-2 soft stools daily.  Dispense: 60 tablet; Refill: 5  - ondansetron (ZOFRAN) 4 MG tablet; Take 1 tablet by mouth Every 8 (Eight) Hours As Needed for Nausea or Vomiting.  Dispense: 30 tablet; Refill: 1    5. Gastroesophageal reflux disease, unspecified whether esophagitis present  She has a longstanding history of occasional reflux if she has been drinking alcohol and bends over.  She has been taking Tums as needed with some improvement.  No difficulty swallowing.  Antireflux measures.  Pantoprazole 40 mg 1 p.o. daily x3 months.    - pantoprazole (PROTONIX) 40 MG EC tablet; 1 po daily in the am 30 minutes before breakfast  Dispense: 30 tablet; Refill: 3    6. Elevated liver function tests  7. History of hepatitis C  8. Alcohol abuse  Labs in 2021  with elevated AST/ALT, normal alkaline phosphatase and total bilirubin.She has a history of IVDA for 15 years, states last use was 7 years ago. She has a history of heavy alcohol use, but has cut back and drinks 6 beers per day on average now. She is wanting to try to stop. She was told to have hepatitis C in the past, labs in 2021 with HCV not detected. No recent labs. She had CTAP yesterday per patient, but we do not have those results.   Discussed continuing to decrease alcohol with goal of stopping in the future.  Labs  Further evaluation if liver enzymes continue to be elevated.    - CBC (No Diff); Future  - Comprehensive Metabolic Panel; Future  - IgA; Future  - Hepatitis A Antibody, Total; Future  - Hepatitis B Surf Antibody Quant; Future  - Hepatitis B Surface Antigen; Future  - Hepatitis B Core Antibody, Total; Future  - Hepatitis C Antibody; Future  - Hepatitis C RNA, Quantitative, PCR (graph); Future  - Hepatitis C Genotype; Future  - Protime-INR; Future  - HIV-1 / O / 2 Ag / Antibody 4th Generation; Future    9. Encounter for screening for malignant neoplasm of colon  She has not had a colonoscopy in the past.  There is no family history of colon cancer.  Colonoscopy for colon cancer screening.    - Case Request; Standing  - Implement Anesthesia Orders Day of Procedure; Standing  - Obtain Informed Consent; Standing  - Verify NPO; Standing  - Verify Bowel Prep Was Successful; Standing  - Oxygen Therapy- Nasal Cannula; 2 LPM; Titrate for SPO2: equal to or greater than, 90%; Standing  - POC Glucose Once; Standing  - sodium chloride 0.9 % infusion  - Case Request  - bisacodyl (DULCOLAX) 5 MG EC tablet; Take as directed for colon prep  Dispense: 4 tablet; Refill: 0  - polyethylene glycol (GoLYTELY) 236 g solution; Take 4,000 mL by mouth 1 (One) Time for 1 dose. Use as directed for colonoscopy prep.  Dispense: 4000 mL; Refill: 0    Patient Instructions   1. Antireflux measures: Avoid fried, fatty foods,  alcohol, chocolate, coffee, tea,  soft drinks, peppermint and spearmint, spicy foods, tomatoes and tomato based foods, onions, peppers, and smoking.   2. Other antireflux measures include weight reduction if overweight, avoiding tight clothing around the abdomen, elevating the head of the bed 6 inches with blocks under the head board, and don't drink or eat before going to bed and avoid lying down immediately after meals.  3. Avoid/stop vaping/smoking.  4. Pantoprazole 40 mg 1 by mouth in the am 30 minutes before breakfast.  5. Zofran 4 mg 1 po every 8 hours as needed for nausea.   6. The patient should eat 4-5 very small meals throughout the day. Avoid large meals.  7. It is recommended to eat a softer diet. Meats are best consumed ground. Fruits and vegetables are best consumed cooked or steamed and then mashed.    8. Low fiber, low fat diet with liberal water intake.   9. Metamucil 1 packet daily.   10. Miralax 17 grams daily.  11. Stool softeners 2 per day.   12. Labs  13. H. Pylori breath test.   14. Advised to decrease and try to stop alcohol use.  15. Will obtain copies of recent CT abdomen and [facility] from [facility].   16. Colonoscopy: The indications, technique, alternatives and potential risk and complications were discussed with the patient including but not limited to bleeding, perforations, missing lesions and anesthetic complications. The patient understands and wishes to proceed with the procedure and has given their verbal consent. Written patient education information was given to the patient.   17. The patient will call if they have further questions before procedure.       Rivera Rodriguez, APRN  10/18/2022    Please note that portions of this note may have been completed with a voice recognition program. Efforts were made to edit the dictations, but occasionally words are mistranscribed.

## 2022-10-18 NOTE — PATIENT INSTRUCTIONS
Antireflux measures: Avoid fried, fatty foods, alcohol, chocolate, coffee, tea,  soft drinks, peppermint and spearmint, spicy foods, tomatoes and tomato based foods, onions, peppers, and smoking.   Other antireflux measures include weight reduction if overweight, avoiding tight clothing around the abdomen, elevating the head of the bed 6 inches with blocks under the head board, and don't drink or eat before going to bed and avoid lying down immediately after meals.  Avoid/stop vaping/smoking.  Pantoprazole 40 mg 1 by mouth in the am 30 minutes before breakfast.  Zofran 4 mg 1 po every 8 hours as needed for nausea.   The patient should eat 4-5 very small meals throughout the day. Avoid large meals.  It is recommended to eat a softer diet. Meats are best consumed ground. Fruits and vegetables are best consumed cooked or steamed and then mashed.    Low fiber, low fat diet with liberal water intake.   Metamucil 1 packet daily.   Miralax 17 grams daily.  Stool softeners 2 per day.   Labs  H. Pylori breath test.   Advised to decrease and try to stop alcohol use.  Will obtain copies of recent CT abdomen and pelvis from [facility].   Colonoscopy: The indications, technique, alternatives and potential risk and complications were discussed with the patient including but not limited to bleeding, perforations, missing lesions and anesthetic complications. The patient understands and wishes to proceed with the procedure and has given their verbal consent. Written patient education information was given to the patient.   The patient will call if they have further questions before procedure.

## 2022-10-19 PROBLEM — Z12.11 ENCOUNTER FOR SCREENING FOR MALIGNANT NEOPLASM OF COLON: Status: ACTIVE | Noted: 2022-10-19

## 2022-10-19 LAB
HAV AB SER QL IA: POSITIVE
HBV CORE AB SERPL QL IA: NEGATIVE
HBV SURFACE AB SER-ACNC: 95.2 MIU/ML
TTG IGA SER-ACNC: <2 U/ML (ref 0–3)
UREA BREATH TEST QL: NEGATIVE

## 2022-10-21 LAB
HCV RNA SERPL NAA+PROBE-ACNC: NORMAL IU/ML
TEST INFORMATION: NORMAL

## 2022-10-27 ENCOUNTER — TELEPHONE (OUTPATIENT)
Dept: GASTROENTEROLOGY | Facility: CLINIC | Age: 57
End: 2022-10-27

## 2022-10-27 NOTE — TELEPHONE ENCOUNTER
----- Message from DAREN Morrison sent at 10/24/2022  9:10 AM EDT -----  Let her know her liber enzymes are still a little elevated, we will do further labs when she comes back in for follow up after her colonoscopy.  Her hepatitis C is not active.

## 2022-10-28 ENCOUNTER — APPOINTMENT (OUTPATIENT)
Dept: PHARMACY | Facility: HOSPITAL | Age: 57
End: 2022-10-28

## 2022-12-13 RX ORDER — GABAPENTIN 600 MG/1
600 TABLET ORAL 3 TIMES DAILY
COMMUNITY

## 2022-12-14 ENCOUNTER — HOSPITAL ENCOUNTER (OUTPATIENT)
Facility: HOSPITAL | Age: 57
Setting detail: HOSPITAL OUTPATIENT SURGERY
Discharge: HOME | End: 2022-12-14
Attending: INTERNAL MEDICINE | Admitting: INTERNAL MEDICINE

## 2022-12-14 ENCOUNTER — ANESTHESIA (OUTPATIENT)
Dept: GASTROENTEROLOGY | Facility: HOSPITAL | Age: 57
End: 2022-12-14

## 2022-12-14 ENCOUNTER — ANESTHESIA EVENT (OUTPATIENT)
Dept: GASTROENTEROLOGY | Facility: HOSPITAL | Age: 57
End: 2022-12-14

## 2022-12-14 VITALS
SYSTOLIC BLOOD PRESSURE: 120 MMHG | WEIGHT: 143 LBS | OXYGEN SATURATION: 95 % | DIASTOLIC BLOOD PRESSURE: 79 MMHG | RESPIRATION RATE: 18 BRPM | HEART RATE: 70 BPM | BODY MASS INDEX: 22.44 KG/M2 | TEMPERATURE: 98.2 F | HEIGHT: 67 IN

## 2022-12-14 DIAGNOSIS — Z12.11 ENCOUNTER FOR SCREENING FOR MALIGNANT NEOPLASM OF COLON: ICD-10-CM

## 2022-12-14 PROCEDURE — 45385 COLONOSCOPY W/LESION REMOVAL: CPT | Performed by: INTERNAL MEDICINE

## 2022-12-14 PROCEDURE — 45380 COLONOSCOPY AND BIOPSY: CPT | Performed by: INTERNAL MEDICINE

## 2022-12-14 PROCEDURE — 25010000002 PROPOFOL 200 MG/20ML EMULSION: Performed by: NURSE ANESTHETIST, CERTIFIED REGISTERED

## 2022-12-14 RX ORDER — HYDROCORTISONE 25 MG/G
CREAM TOPICAL 2 TIMES DAILY PRN
Qty: 28 G | Refills: 1 | Status: SHIPPED | OUTPATIENT
Start: 2022-12-14

## 2022-12-14 RX ORDER — SODIUM CHLORIDE 9 MG/ML
70 INJECTION, SOLUTION INTRAVENOUS CONTINUOUS PRN
Status: DISCONTINUED | OUTPATIENT
Start: 2022-12-14 | End: 2022-12-14 | Stop reason: HOSPADM

## 2022-12-14 RX ORDER — LIDOCAINE HYDROCHLORIDE 20 MG/ML
INJECTION, SOLUTION INTRAVENOUS AS NEEDED
Status: DISCONTINUED | OUTPATIENT
Start: 2022-12-14 | End: 2022-12-14 | Stop reason: SURG

## 2022-12-14 RX ORDER — SIMETHICONE 20 MG/.3ML
EMULSION ORAL AS NEEDED
Status: DISCONTINUED | OUTPATIENT
Start: 2022-12-14 | End: 2022-12-14 | Stop reason: HOSPADM

## 2022-12-14 RX ORDER — PROPOFOL 10 MG/ML
INJECTION, EMULSION INTRAVENOUS AS NEEDED
Status: DISCONTINUED | OUTPATIENT
Start: 2022-12-14 | End: 2022-12-14 | Stop reason: SURG

## 2022-12-14 RX ADMIN — PROPOFOL 50 MG: 10 INJECTION, EMULSION INTRAVENOUS at 09:21

## 2022-12-14 RX ADMIN — PROPOFOL 50 MG: 10 INJECTION, EMULSION INTRAVENOUS at 09:29

## 2022-12-14 RX ADMIN — LIDOCAINE HYDROCHLORIDE 60 MG: 20 INJECTION, SOLUTION INTRAVENOUS at 09:21

## 2022-12-14 RX ADMIN — SODIUM CHLORIDE 70 ML/HR: 9 INJECTION, SOLUTION INTRAVENOUS at 09:03

## 2022-12-14 RX ADMIN — PROPOFOL 50 MG: 10 INJECTION, EMULSION INTRAVENOUS at 09:25

## 2022-12-14 RX ADMIN — PROPOFOL 50 MG: 10 INJECTION, EMULSION INTRAVENOUS at 09:43

## 2022-12-14 RX ADMIN — PROPOFOL 50 MG: 10 INJECTION, EMULSION INTRAVENOUS at 09:37

## 2022-12-14 NOTE — ANESTHESIA POSTPROCEDURE EVALUATION
Patient: Brad Snowden    Procedure Summary     Date: 12/14/22 Room / Location: [facility] ENDOSCOPY 1 / [facility] ENDOSCOPY    Anesthesia Start: 0918 Anesthesia Stop: 0959    Procedure: COLONOSCOPY WITH BIOPSIES AND POLYPECTOMY (Anus) Diagnosis:       Encounter for screening for malignant neoplasm of colon      (Encounter for screening for malignant neoplasm of colon [Z12.11])    Surgeons: Yisel Garza MD Provider: Modesto Ramirez CRNA    Anesthesia Type: MAC ASA Status: 3          Anesthesia Type: MAC    Vitals  No vitals data found for the desired time range.          Post Anesthesia Care and Evaluation    Patient location during evaluation: bedside  Patient participation: complete - patient participated  Level of consciousness: awake and alert  Pain score: 0  Pain management: adequate    Airway patency: patent  Anesthetic complications: No anesthetic complications  PONV Status: none  Cardiovascular status: acceptable  Respiratory status: acceptable  Hydration status: acceptable

## 2022-12-14 NOTE — DISCHARGE INSTRUCTIONS
- Discharge patient to home (ambulatory).   - High fiber diet.   - Continue present medications.   - Regular laxatives   - Anusol cream BID prn or preparation H BID prn  - Await pathology results.   - Repeat colonoscopy in 3 years for surveillance.   - Return to GI office in 8 weeks.     To assist you in voiding:  Drink plenty of fluids  Listen to running water while attempting to void.    If you are unable to urinate and you have an uncomfortable urge to void or it has been   6 hours since you were discharged, return to the Emergency Room     Rest today  No pushing,pulling,tugging,heavy lifting, or strenuous activity   No major decision making,driving,or drinking alcoholic beverages for 24 hours due to the sedation you received  Always use good hand hygiene/washing technique  No driving on pain medication.

## 2022-12-14 NOTE — H&P
"Psychiatric  HISTORY AND PHYSICAL    Patient Name: Brad Snowden  : 1965  MRN: [id number]    Chief Complaint:   For screening colonoscopy    History Of Presenting Illness:    Screening colon  Change in bowel habit habit        Past Medical History:   Diagnosis Date   • Alcohol abuse    • Anxiety    • Cervical spondylosis with radiculopathy    • Constipation    • COPD (chronic obstructive pulmonary disease) (HCC)    • Depression    • CARINE (generalized anxiety disorder)    • History of heart attack    • History of hepatitis C    • Hyperlipidemia    • Injury of back    • Injury of neck    • Left lower quadrant pain    • Liver disease    • Shortness of breath        Past Surgical History:   Procedure Laterality Date   •  SECTION     • OVARY SURGERY      right   • TEETH EXTRACTION         Social History     Socioeconomic History   • Marital status:    Tobacco Use   • Smoking status: Every Day     Packs/day: 0.50     Years: 20.00     Pack years: 10.00     Types: Cigarettes     Start date: 1990   Vaping Use   • Vaping Use: Never used   Substance and Sexual Activity   • Alcohol use: Yes     Alcohol/week: 2.0 standard drinks     Types: 2 Cans of beer per week     Comment: Hx abuse   • Drug use: Not Currently     Types: Morphine     Comment: \"Clean for 7 years\"   • Sexual activity: Defer       Family History   Problem Relation Age of Onset   • Cirrhosis Mother    • Liver disease Mother    • Diabetes Mother    • Cervical cancer Mother    • Lung cancer Mother    • Hodgkin's lymphoma Mother    • Stroke Mother    • Alcohol abuse Mother    • Liver disease Father    • Alcohol abuse Father    • Hypertension Sister    • Kidney cancer Sister    • Prader-Willi syndrome Sister    • Cancer Sister         Bladder, possibly kidney as well   • Hypertension Brother    • Alcohol abuse Brother    • Colon cancer Neg Hx        Prior to Admission Medications:  Medications Prior to Admission   Medication " Sig Dispense Refill Last Dose   • albuterol sulfate  (90 Base) MCG/ACT inhaler albuterol sulfate HFA 90 mcg/actuation aerosol inhaler   INHALE 2 PUFFS EVERY 6 HOURS   12/13/2022   • betamethasone dipropionate 0.05 % cream APPLY TOPICALLY TO THE AFFECTED AREA TWICE DAILY FOR 10 DAYS   12/13/2022   • bisacodyl (DULCOLAX) 5 MG EC tablet Take as directed for colon prep 4 tablet 0 12/13/2022   • buprenorphine-naloxone (SUBOXONE) 8-2 MG per SL tablet buprenorphine 8 mg-naloxone 2 mg sublingual tablet   DISSOLVE 3/4 TABLET UNDER THE TONGUE EVERY DAY   12/14/2022   • diclofenac (VOLTAREN) 75 MG EC tablet diclofenac sodium 75 mg tablet,delayed release   TK 1 T PO BID   12/13/2022   • Diclofenac Sodium (VOLTAREN) 1 % gel gel APPLY 1 GRAM TOPICALLY TO THE AFFECTED AREA TWICE DAILY   12/13/2022   • Docusate Sodium 100 MG capsule Take 1 - 2 tablets daily. Adjust to have 1-2 soft stools daily. 60 tablet 5 12/13/2022   • gabapentin (NEURONTIN) 600 MG tablet Take 600 mg by mouth 3 (Three) Times a Day.   12/13/2022   • montelukast (SINGULAIR) 10 MG tablet Take 10 mg by mouth Daily.   12/13/2022   • nicotine (NICODERM CQ) 14 MG/24HR patch nicotine 14 mg/24 hr daily transdermal patch   APPLY 1 PATCH TOPICALLY TO THE SKIN EVERY DAY   12/13/2022   • ondansetron (ZOFRAN) 4 MG tablet Take 1 tablet by mouth Every 8 (Eight) Hours As Needed for Nausea or Vomiting. 30 tablet 1 12/13/2022   • pantoprazole (PROTONIX) 40 MG EC tablet 1 po daily in the am 30 minutes before breakfast 30 tablet 3 12/13/2022   • Polyethylene Glycol powder Take 1 cap full (17 grams) in 8 oz of noncarbonated beverage and drink once daily 500 g 3 12/13/2022   • Symbicort 160-4.5 MCG/ACT inhaler Daily As Needed.   12/13/2022   • busPIRone (BUSPAR) 7.5 MG tablet Take 7.5 mg by mouth 2 (Two) Times a Day.      • cyclobenzaprine (FLEXERIL) 10 MG tablet TAKE 1 TABLET BY MOUTH THREE TIMES DAILY AS NEEDED FOR SPASM      • ibuprofen (ADVIL,MOTRIN) 800 MG tablet ibuprofen  800 mg tablet   TAKE 1 TABLET BY MOUTH THREE TIMES DAILY AS NEEDED      • psyllium (METAMUCIL) 58.6 % packet Take 1 packet by mouth Daily.          Allergies:  No Known Allergies     Vitals: Temp:  [97.8 °F (36.6 °C)] 97.8 °F (36.6 °C)  Heart Rate:  [71] 71  Resp:  [18] 18  BP: (130)/(67) 130/67    Review Of Systems:  Constitutional:  Negative for chills, fever, and unexpected weight change.  Respiratory:  Negative for cough, chest tightness, shortness of breath, and wheezing.  Cardiovascular:  Negative for chest pain, palpitations, and leg swelling.  Gastrointestinal:  Negative for abdominal distention, abdominal pain, Nausea, vomiting.  Neurological:  Negative for Weakness, numbness, and headaches.     Physical Exam:    General Appearance:  Alert, cooperative, in no acute distress.   Lungs:   Clear to auscultation, respirations regular, even and                 unlabored.   Heart:  Regular rhythm and normal rate.   Abdomen:   Normal bowel sounds, no masses, no organomegaly. Soft, non-tender, non-distended   Neurologic: Alert and oriented x 3. Moves all four limbs equally       Plan: COLONOSCOPY (N/A)     Yisel Garza MD  12/14/2022

## 2022-12-14 NOTE — ANESTHESIA PREPROCEDURE EVALUATION
Anesthesia Evaluation     Patient summary reviewed and Nursing notes reviewed   NPO Solid Status: > 8 hours  NPO Liquid Status: > 2 hours           Airway   Mallampati: II  TM distance: >3 FB  Neck ROM: limited  Possible difficult intubation  Dental - normal exam     Pulmonary - normal exam   (+) a smoker Current, COPD, shortness of breath,   Cardiovascular - normal exam    (+) past MI , hyperlipidemia,       Neuro/Psych  (+) numbness, psychiatric history Anxiety and Depression,    GI/Hepatic/Renal/Endo    (+)  GERD, GI bleeding lower , hepatitis C, liver disease,     Musculoskeletal     (+) neck pain,   Abdominal    Substance History   (+) alcohol use, drug use     OB/GYN          Other   arthritis,      ROS/Med Hx Other: Cervical spondylosis with radiculopathy from injury    On Suboxone                  Anesthesia Plan    ASA 3     MAC     (Risks and benefits discussed including risk of aspiration, recall and dental damage. All patient questions answered.    Will continue with plan of care.)  intravenous induction     Anesthetic plan, risks, benefits, and alternatives have been provided, discussed and informed consent has been obtained with: patient.  Pre-procedure education provided  Plan discussed with CRNA.        CODE STATUS:

## 2022-12-15 LAB — REF LAB TEST METHOD: NORMAL

## 2023-11-15 ENCOUNTER — TRANSCRIBE ORDERS (OUTPATIENT)
Dept: ADMINISTRATIVE | Facility: HOSPITAL | Age: 58
End: 2023-11-15
Payer: COMMERCIAL

## 2023-11-15 DIAGNOSIS — F17.210 CIGARETTE SMOKER: Primary | ICD-10-CM

## 2023-12-15 ENCOUNTER — HOSPITAL ENCOUNTER (OUTPATIENT)
Dept: CT IMAGING | Facility: HOSPITAL | Age: 58
Discharge: HOME | End: 2023-12-15
Admitting: NURSE PRACTITIONER
Payer: COMMERCIAL

## 2023-12-15 DIAGNOSIS — F17.210 CIGARETTE SMOKER: ICD-10-CM

## 2023-12-15 PROCEDURE — 71271 CT THORAX LUNG CANCER SCR C-: CPT

## 2024-06-28 ENCOUNTER — TRANSCRIBE ORDERS (OUTPATIENT)
Dept: ADMINISTRATIVE | Facility: HOSPITAL | Age: 59
End: 2024-06-28
Payer: COMMERCIAL

## 2024-06-28 DIAGNOSIS — S62.92XA CLOSED FRACTURE OF LEFT HAND, INITIAL ENCOUNTER: ICD-10-CM

## 2024-06-28 DIAGNOSIS — R23.0 CYANOSIS: Primary | ICD-10-CM

## 2024-06-28 DIAGNOSIS — R22.32 LOCALIZED SWELLING ON LEFT HAND: ICD-10-CM

## 2024-06-28 DIAGNOSIS — M25.642 STIFFNESS OF JOINT, HAND, LEFT: ICD-10-CM

## 2024-06-28 DIAGNOSIS — R29.898 LEFT HAND WEAKNESS: ICD-10-CM

## 2024-08-06 ENCOUNTER — TRANSCRIBE ORDERS (OUTPATIENT)
Dept: ADMINISTRATIVE | Facility: HOSPITAL | Age: 59
End: 2024-08-06
Payer: COMMERCIAL

## 2024-08-06 DIAGNOSIS — Z87.81 HX OF FRACTURE OF WRIST: ICD-10-CM

## 2024-08-06 DIAGNOSIS — R60.0 HAND EDEMA: ICD-10-CM

## 2024-08-06 DIAGNOSIS — R29.898 LEFT HAND WEAKNESS: ICD-10-CM

## 2024-08-06 DIAGNOSIS — R23.0 EXTREMITY CYANOSIS: ICD-10-CM

## 2024-08-06 DIAGNOSIS — M25.642 STIFFNESS OF HAND JOINT, LEFT: Primary | ICD-10-CM

## (undated) DEVICE — FRCP BIOP RADLJAW4 HOT 2.2X240 BX40

## (undated) DEVICE — VLV SXN AIR/H2O ORCAPOD3 1P/U STRL

## (undated) DEVICE — QUICK CATCH IN-LINE SUCTION POLYP TRAP IS USED FOR SUCTION RETRIEVAL OF ENDOSCOPICALLY REMOVED POLYPS.

## (undated) DEVICE — SNAR POLYP CAPTIVATOR2 RND STFF 2.4 10MM 240CM

## (undated) DEVICE — ENDOSCOPY PORT CONNECTOR FOR OLYMPUS® SCOPES: Brand: ERBE

## (undated) DEVICE — LUBE JELLY PK/2.75GM STRL BX/144

## (undated) DEVICE — Device

## (undated) DEVICE — FRCP BX RADJAW4 NDL 2.8 240 STD OG

## (undated) DEVICE — HYBRID TUBING/CAP SET FOR OLYMPUS® SCOPES: Brand: ERBE